# Patient Record
Sex: FEMALE | Race: WHITE
[De-identification: names, ages, dates, MRNs, and addresses within clinical notes are randomized per-mention and may not be internally consistent; named-entity substitution may affect disease eponyms.]

---

## 2020-02-24 ENCOUNTER — HOSPITAL ENCOUNTER (OUTPATIENT)
Dept: HOSPITAL 84 - D.LDO | Age: 25
Discharge: HOME | End: 2020-02-24
Attending: OBSTETRICS & GYNECOLOGY
Payer: MEDICAID

## 2020-02-24 DIAGNOSIS — R05: ICD-10-CM

## 2020-02-24 DIAGNOSIS — Z3A.00: ICD-10-CM

## 2020-02-24 DIAGNOSIS — O26.899: Primary | ICD-10-CM

## 2020-05-30 ENCOUNTER — HOSPITAL ENCOUNTER (OUTPATIENT)
Dept: HOSPITAL 84 - D.LDO | Age: 25
Discharge: HOME | End: 2020-05-30
Attending: OBSTETRICS & GYNECOLOGY
Payer: MEDICAID

## 2020-05-30 DIAGNOSIS — Z3A.34: ICD-10-CM

## 2020-05-30 DIAGNOSIS — O36.5930: Primary | ICD-10-CM

## 2020-06-05 ENCOUNTER — HOSPITAL ENCOUNTER (OUTPATIENT)
Dept: HOSPITAL 84 - D.LDO | Age: 25
End: 2020-06-05
Attending: OBSTETRICS & GYNECOLOGY
Payer: MEDICAID

## 2020-06-05 DIAGNOSIS — O36.5930: Primary | ICD-10-CM

## 2020-06-05 DIAGNOSIS — Z3A.34: ICD-10-CM

## 2020-06-19 ENCOUNTER — HOSPITAL ENCOUNTER (OUTPATIENT)
Dept: HOSPITAL 84 - D.LDO | Age: 25
Discharge: HOME | End: 2020-06-19
Attending: OBSTETRICS & GYNECOLOGY
Payer: MEDICAID

## 2020-06-19 DIAGNOSIS — O36.5990: Primary | ICD-10-CM

## 2020-06-19 DIAGNOSIS — Z3A.00: ICD-10-CM

## 2020-06-23 ENCOUNTER — HOSPITAL ENCOUNTER (OUTPATIENT)
Dept: HOSPITAL 84 - D.LDO | Age: 25
Discharge: HOME | End: 2020-06-23
Attending: OBSTETRICS & GYNECOLOGY
Payer: MEDICAID

## 2020-06-23 DIAGNOSIS — Z3A.37: ICD-10-CM

## 2020-06-23 DIAGNOSIS — O36.8930: Primary | ICD-10-CM

## 2020-07-01 ENCOUNTER — HOSPITAL ENCOUNTER (OUTPATIENT)
Dept: HOSPITAL 84 - D.LDO | Age: 25
Discharge: HOME | End: 2020-07-01
Attending: OBSTETRICS & GYNECOLOGY
Payer: MEDICAID

## 2020-07-01 ENCOUNTER — HOSPITAL ENCOUNTER (INPATIENT)
Dept: HOSPITAL 84 - D.LD | Age: 25
LOS: 2 days | Discharge: HOME | End: 2020-07-03
Attending: OBSTETRICS & GYNECOLOGY | Admitting: OBSTETRICS & GYNECOLOGY
Payer: MEDICAID

## 2020-07-01 VITALS — BODY MASS INDEX: 24.11 KG/M2 | HEIGHT: 66 IN | WEIGHT: 150 LBS

## 2020-07-01 VITALS — BODY MASS INDEX: 24.2 KG/M2

## 2020-07-01 VITALS — DIASTOLIC BLOOD PRESSURE: 58 MMHG | SYSTOLIC BLOOD PRESSURE: 116 MMHG

## 2020-07-01 DIAGNOSIS — Z3A.38: ICD-10-CM

## 2020-07-01 DIAGNOSIS — Z87.891: ICD-10-CM

## 2020-07-01 DIAGNOSIS — O36.5930: Primary | ICD-10-CM

## 2020-07-01 LAB
AMPHETAMINES UR QL SCN: NEGATIVE QUAL
BARBITURATES UR QL SCN: NEGATIVE QUAL
BENZODIAZ UR QL SCN: NEGATIVE QUAL
BZE UR QL SCN: NEGATIVE QUAL
CANNABINOIDS UR QL SCN: NEGATIVE QUAL
ERYTHROCYTE [DISTWIDTH] IN BLOOD BY AUTOMATED COUNT: 14.3 % (ref 11.5–14.5)
HCT VFR BLD CALC: 35.5 % (ref 36–48)
HGB BLD-MCNC: 11.7 G/DL (ref 12–16)
MCH RBC QN AUTO: 28.5 PG (ref 26–34)
MCHC RBC AUTO-ENTMCNC: 33 G/DL (ref 31–37)
MCV RBC: 86.6 FL (ref 80–100)
OPIATES UR QL SCN: NEGATIVE QUAL
PCP UR QL SCN: NEGATIVE QUAL
PLATELET # BLD: 237 10X3/UL (ref 130–400)
PMV BLD AUTO: 10.8 FL (ref 7.4–10.4)
RBC # BLD AUTO: 4.1 10X6/UL (ref 4–5.4)
WBC # BLD AUTO: 9.8 10X3/UL (ref 4.8–10.8)

## 2020-07-02 VITALS — SYSTOLIC BLOOD PRESSURE: 118 MMHG | DIASTOLIC BLOOD PRESSURE: 74 MMHG

## 2020-07-02 NOTE — NUR
ROUNDS MADE. PT CURRENTLY LYING BED W/BABY IN ARMS FOR NURSING. PAIN AND NEEDS
ASSESSED. PT DENIES NEEDS AND STATES "MY PAIN IS OK."

## 2020-07-02 NOTE — NUR
ROUNDS MADE. PT INFORMED IT WILL BE ANOTHER FEW MINUTES BEFORE TRANSFER.
ADDITIONAL PERIADS PROVIDED. PT DENIES FURTHER NEEDS. PAIN REASSESED. PT
REPORTS AFTER VOIDING, HER CRAMPING IS SOME BETTER. RATES 4/10.

## 2020-07-02 NOTE — NUR
THIS RN TO BEDSIDE TO TRANSFER PT TO CLEAN ROOM. PT AA&O X 4. PAIN REASSESSED.
PT REPORTS CRAMPING IS A LITTLE BETTER. PT INFORMED THAT DR QUINTERO WAS GIVEN
REPORT THAT TORADOL WAS NOT CONTROLLING HER PAIN AND SHE HAS GIVEN AN ORDER
THAT TORADOL CAN BE CHANGED TO MOTRIN FOR CRAMPING. PT VERBALIZES
UNDERSTANDING. PT TRANSFERED TO [facility]. ORIENTED TO ROOM, CALL LIGHT AND
PHONE. ADDITIONAL PANTIES,PADS AND TOWELS PROVIDED. PT [facility] MUG FILLED W/ICE
WATER. PT DENIES NEEDS. BABY IN OPEN CRIB AT [facility].

## 2020-07-02 NOTE — NUR
THIS RN TO BEDSIDE FOR INTRODUCTIONS AND SHIFT ASSESSMENT. REC'D PT AA&O X 4.
PAIN ASSESSED. PT REPORTS SHE IS STILL HAVING ABD CRAMPING EVEN AFTER ADMIN OF
TORADOL. TYLENOL 1000MG PO GIVEN AND FRESH ICE WATER SERVED. SEE FLOWSHEET FOR
DOC OF ASSESSMENT. PT INFORMED THAT A OOM IS BEING CLEANED FOR HER AND ONCE IT
IS CLEANED, SHE WILL BE TRANSFERED. PT VERBALIZES UNDERSTANDING. PT DENIES
NEEDS AT THIS TIME. BED LOW, SIDE RAILS UP X 2. CALL LIGHT AT PT'S SIDE. BABY
IN ROOM IN OPEN CRIB AT BEDSIDE.

## 2020-07-03 VITALS — SYSTOLIC BLOOD PRESSURE: 118 MMHG | DIASTOLIC BLOOD PRESSURE: 76 MMHG

## 2020-07-03 LAB
ERYTHROCYTE [DISTWIDTH] IN BLOOD BY AUTOMATED COUNT: 14.3 % (ref 11.5–14.5)
HCT VFR BLD CALC: 38.5 % (ref 36–48)
HGB BLD-MCNC: 12.3 G/DL (ref 12–16)
MCH RBC QN AUTO: 28.1 PG (ref 26–34)
MCHC RBC AUTO-ENTMCNC: 31.9 G/DL (ref 31–37)
MCV RBC: 87.9 FL (ref 80–100)
PLATELET # BLD: 226 10X3/UL (ref 130–400)
PMV BLD AUTO: 11.1 FL (ref 7.4–10.4)
RBC # BLD AUTO: 4.38 10X6/UL (ref 4–5.4)
WBC # BLD AUTO: 9.5 10X3/UL (ref 4.8–10.8)

## 2020-07-03 NOTE — NUR
STATED PAIN IS WORSE AGAIN CAN SHE HAVE MEDS EXPLAINED ITS NOT TIME. ENC PT TO
GO FOR A WALK OR TRY AN ICE PACK. PT STATED SHE WILL TAKE BABY TO NURSERY AND
GO WALK.

## 2020-07-03 NOTE — NUR
THIS RN CALLED TO PT'S ROOM FOR ASSISTANCE W/NURSING. THIS RN REMAINS AT
BEDSIDE TO ASSIST W/STIMULATION OF BABY. PAIN REASSESSED. PT REPORTS ABD
CRAMPING 4/10.

## 2020-07-03 NOTE — NUR
ROOM CHECK BABY IN CRIB AT BEDSIDE MOM SLEEPING. MOM STATED PAIN AND NAUSEA IS
BETTER BUT SHE WANTS SOMETHING ELSE FOR PAIN. ASKED PT IF SHE IS OK TO GO HOME
SHE STATED YES SHE IS READY WHEN BABY IS READY.

## 2020-07-03 NOTE — NUR
RHOGAM GIVEN PER MAR DISCHARGE PAPERWORK REVIEWED AND SIGNED AND PTS COPIES
GIVEN. EXPLAINED TO PT THAT SHE WILL HAVE TO CALL MONDAY MORNING TO SCHEDULE
HER FOLLOW UP WITH DR JOHNSON. PT VERBALIZED UNDERSTANDING.

## 2020-07-03 NOTE — NUR
ROUNDS MADE. PT LYING IN SUPINE POSITION W/EYES CLOSED. OPENS THEM
SPONTANEOUSLY W/THIS RN'S ENTRY TO THE ROOM. PAIN AND NEEDS ASSESSED. PT
DENIES PAIN. DECLINES OFFER TO ADMIN MOTRIN.

## 2020-07-03 NOTE — NUR
PT RINGS CALL LIGHT REQUEST SOMETHING TO EAT. SANDWICH TRAY AND ICE CREAM
SERVED PER REQUEST. DENIES FURTHER NEEDS.

## 2020-07-03 NOTE — NUR
AWAKE AND ALERT IN BED COMPLAINING OF PAIN 9/10. PLAYING ON PHONE NO VISIBLE
DISTRESS NOTED. MOTRIN GIVEN PER MAR.

## 2020-07-03 NOTE — NUR
EXPLAINED TO PT ABOUT GIVING RHOGAM IV DILUTED IN SALINE. PT VERBALIZED
UNDERSTANDING AND HAS REVIEWED HANDOUT. PT PLANS ON TUBALIGATION AFTER 6 WEEKS
BUT AGREED TO IV RHOGAM.

## 2020-07-03 NOTE — NUR
VSS. ASSESSMENT COMPLETED. CONTINUES TO PLAY ON PHONE AND REQUESTED STRONGER
PAIN MEDICINE FOR CRAMPS. EXPLAINED WE WILL HAVE TO CHECK WITH DR JOHNSON. PT
AGREED.

## 2020-07-03 NOTE — NUR
VOMITING AND COMPLAINING OF CRAMPING IN ABDOMEN. REQUESTED PAIN MEDICINE AND
SOMETHING FOR NAUSEA. ZOFRAN AND MOTRIN GIVEN PER MAR.

## 2020-07-03 NOTE — NUR
COMPLAINING OF PAIN 10/10. BABY AT BREAST NURSING EXPLAINED THAT HER CRAMPING
IS HER UTERUS BASIA BACK DOWN AND THAT BREAST FEEDING IS THE BEST BUT IT
MAKES IT GO DOWN FASTER AND CAN BE PAINFUL. TYLENOL 3 GIVEN PER MAR. PT ASKED
ABOUT WHAT MEDS SHE WILL HAVE AT DISCHARGE EXPLAINE DR JOHNSON WROTE A
SCRIPT FOR TYLENOL 3.